# Patient Record
Sex: MALE | Race: BLACK OR AFRICAN AMERICAN | NOT HISPANIC OR LATINO | Employment: OTHER | URBAN - METROPOLITAN AREA
[De-identification: names, ages, dates, MRNs, and addresses within clinical notes are randomized per-mention and may not be internally consistent; named-entity substitution may affect disease eponyms.]

---

## 2020-10-16 ENCOUNTER — HOSPITAL ENCOUNTER (EMERGENCY)
Facility: HOSPITAL | Age: 30
Discharge: HOME | End: 2020-10-16
Attending: EMERGENCY MEDICINE

## 2020-10-16 VITALS
OXYGEN SATURATION: 97 % | BODY MASS INDEX: 35.55 KG/M2 | DIASTOLIC BLOOD PRESSURE: 79 MMHG | RESPIRATION RATE: 18 BRPM | TEMPERATURE: 99 F | SYSTOLIC BLOOD PRESSURE: 131 MMHG | HEIGHT: 69 IN | WEIGHT: 240 LBS | HEART RATE: 67 BPM

## 2020-10-16 DIAGNOSIS — S61.214A LACERATION OF RIGHT RING FINGER WITHOUT FOREIGN BODY WITHOUT DAMAGE TO NAIL, INITIAL ENCOUNTER: ICD-10-CM

## 2020-10-16 DIAGNOSIS — S61.316A LACERATION OF RIGHT LITTLE FINGER WITHOUT FOREIGN BODY WITH DAMAGE TO NAIL, INITIAL ENCOUNTER: Primary | ICD-10-CM

## 2020-10-16 PROCEDURE — 12001 RPR S/N/AX/GEN/TRNK 2.5CM/<: CPT

## 2020-10-16 PROCEDURE — 99283 EMERGENCY DEPT VISIT LOW MDM: CPT | Mod: 25

## 2020-10-16 PROCEDURE — 25000003 PHARM REV CODE 250: Performed by: PHYSICIAN ASSISTANT

## 2020-10-16 RX ORDER — CEPHALEXIN 500 MG/1
500 CAPSULE ORAL 4 TIMES DAILY
Qty: 28 CAPSULE | Refills: 0 | Status: SHIPPED | OUTPATIENT
Start: 2020-10-16 | End: 2020-10-23

## 2020-10-16 RX ORDER — CEPHALEXIN 500 MG/1
500 CAPSULE ORAL
Status: COMPLETED | OUTPATIENT
Start: 2020-10-16 | End: 2020-10-16

## 2020-10-16 RX ORDER — IBUPROFEN 600 MG/1
600 TABLET ORAL EVERY 6 HOURS PRN
Qty: 20 TABLET | Refills: 0 | Status: SHIPPED | OUTPATIENT
Start: 2020-10-16

## 2020-10-16 RX ORDER — HYDROCODONE BITARTRATE AND ACETAMINOPHEN 5; 325 MG/1; MG/1
1 TABLET ORAL EVERY 6 HOURS PRN
Qty: 10 TABLET | Refills: 0 | Status: SHIPPED | OUTPATIENT
Start: 2020-10-16

## 2020-10-16 RX ORDER — BACITRACIN ZINC 500 UNIT/G
OINTMENT (GRAM) TOPICAL 2 TIMES DAILY
Qty: 14 G | Refills: 0 | Status: SHIPPED | OUTPATIENT
Start: 2020-10-16

## 2020-10-16 RX ORDER — LIDOCAINE HYDROCHLORIDE 10 MG/ML
10 INJECTION INFILTRATION; PERINEURAL
Status: COMPLETED | OUTPATIENT
Start: 2020-10-16 | End: 2020-10-16

## 2020-10-16 RX ORDER — IBUPROFEN 600 MG/1
600 TABLET ORAL
Status: COMPLETED | OUTPATIENT
Start: 2020-10-16 | End: 2020-10-16

## 2020-10-16 RX ADMIN — CEPHALEXIN 500 MG: 500 CAPSULE ORAL at 07:10

## 2020-10-16 RX ADMIN — LIDOCAINE HYDROCHLORIDE 10 ML: 10 INJECTION, SOLUTION INFILTRATION; PERINEURAL at 07:10

## 2020-10-16 RX ADMIN — IBUPROFEN 600 MG: 600 TABLET, FILM COATED ORAL at 07:10

## 2020-10-16 RX ADMIN — BACITRACIN, NEOMYCIN, POLYMYXIN B 1 EACH: 400; 3.5; 5 OINTMENT TOPICAL at 07:10

## 2020-10-16 NOTE — ED TRIAGE NOTES
Pt presents to the ED via personal transportation reporting about 15 minutes ago PTA he cut the top of his right pinky finger off. Pt also has a laceration noted to the ring finger on the right hand. Pt reports tetanus is up to date. Bleeding controlled at this time. Pt in NAD.

## 2020-10-16 NOTE — ED PROVIDER NOTES
Encounter Date: 10/16/2020       History     Chief Complaint   Patient presents with    Extremity Laceration     right pinky finger laceration PTA bleeding controlled     28-year-old male with chief complaint laceration to right hand 4th and 5th digits sustained around 3:00 p.m. while at work today.  Patient states hand caught between a tree and the bucket of a front loading tractor; denies significant crush injury, however states he did cut his 2 fingers.  Mild numbness to the radial aspect of the tip of the 4th digit, avulsion of the tuft of the 5th digit.  No uncontrolled bleeding.  No foreign body.  Unknown tetanus status.  He is right-handed.  Denies any other injuries.        Review of patient's allergies indicates:  No Known Allergies  History reviewed. No pertinent past medical history.  Past Surgical History:   Procedure Laterality Date    HERNIA REPAIR      SHOULDER ARTHROSCOPY W/ CAPSULAR REPAIR       History reviewed. No pertinent family history.  Social History     Tobacco Use    Smoking status: Never Smoker    Smokeless tobacco: Never Used   Substance Use Topics    Alcohol use: Not Currently    Drug use: Not on file     Review of Systems   Constitutional: Negative for fever.   Respiratory: Negative for shortness of breath.    Cardiovascular: Negative for chest pain.   Gastrointestinal: Negative for nausea and vomiting.   Musculoskeletal: Positive for arthralgias. Negative for neck pain.   Skin: Positive for wound.   Neurological: Positive for numbness. Negative for weakness.       Physical Exam     Initial Vitals [10/16/20 1818]   BP Pulse Resp Temp SpO2   (!) 136/90 72 18 98.3 °F (36.8 °C) 96 %      MAP       --         Physical Exam    Nursing note and vitals reviewed.  Constitutional: He appears well-developed and well-nourished. He is not diaphoretic. No distress.   HENT:   Head: Normocephalic and atraumatic.   Eyes: EOM are normal.   Neck: Neck supple.   Cardiovascular: Intact distal  pulses.   2+ radial bilaterally   Pulmonary/Chest: No respiratory distress.   Musculoskeletal: Normal range of motion.      Comments: Right hand, 4th digit with laceration to the distal phalanx adjacent to the nail, ulnar aspect of finger.  There is mild numbness to the area distal to the laceration.  No uncontrolled bleeding.  No foreign body.  No nail involvement.  No bony deformity.    5th digit with avulsion of the tuft of the finger, the tip of the nail distally has been cut, however the root and proximal aspect of the nail and distal phalanx is unaffected.  Possible exposed bone.  No uncontrolled bleeding.   Neurological: He is alert and oriented to person, place, and time.   Skin: Skin is warm. Capillary refill takes less than 2 seconds.   Psychiatric: He has a normal mood and affect. Thought content normal.         ED Course   Lac Repair    Date/Time: 10/16/2020 2:44 AM  Performed by: Reece Anglin PA-C  Authorized by: Wai Shelley MD     Consent:     Consent obtained:  Verbal    Consent given by:  Patient    Risks discussed:  Infection, pain, need for additional repair, poor cosmetic result, nerve damage and poor wound healing    Alternatives discussed:  No treatment  Anesthesia (see MAR for exact dosages):     Anesthesia method:  Nerve block    Block location:  Proximal phalanx 4th, 5th digits    Block needle gauge:  27 G    Block anesthetic:  Lidocaine 1% w/o epi    Block injection procedure:  Anatomic landmarks identified, introduced needle, incremental injection, anatomic landmarks palpated and negative aspiration for blood    Block outcome:  Anesthesia achieved  Laceration details:     Location:  Finger    Finger location:  R ring finger  Repair type:     Repair type:  Simple  Pre-procedure details:     Preparation:  Patient was prepped and draped in usual sterile fashion and imaging obtained to evaluate for foreign bodies  Exploration:     Hemostasis achieved with:  Direct pressure    Wound  exploration: wound explored through full range of motion and entire depth of wound probed and visualized      Wound extent: nerve damage      Contaminated: no    Treatment:     Area cleansed with:  Betadine and saline    Amount of cleaning:  Standard    Irrigation solution:  Sterile saline    Irrigation method:  Syringe  Skin repair:     Repair method:  Sutures    Suture size:  5-0    Suture material:  Nylon    Suture technique:  Simple interrupted    Number of sutures:  3  Approximation:     Approximation:  Loose  Post-procedure details:     Dressing:  Antibiotic ointment, adhesive bandage and splint for protection    Patient tolerance of procedure:  Tolerated well, no immediate complications      Labs Reviewed - No data to display       Imaging Results          X-Ray Hand 3 view Right (Final result)  Result time 10/16/20 19:48:16    Final result by Елена Cartagena MD (10/16/20 19:48:16)                 Impression:      Soft tissue amputation at the tip of the 5th digit.  Two tiny hyperdensities adjacent to the tuft of the 5th digit, as above described.      Electronically signed by: Елена Cartagena  Date:    10/16/2020  Time:    19:48             Narrative:    EXAMINATION:  THREE VIEWS OF THE RIGHT HAND    CLINICAL HISTORY:  laceration; suspect tuft fx 5th digit, laceration also 4th distal phalanx ulnar aspect;    TECHNIQUE:  AP, lateral, and oblique views of the right hand    COMPARISON:  None.    FINDINGS:  Three views of the right hand demonstrate soft tissue amputation of the tip of the 5th digit.  There are 2 tiny ossific densities adjacent to the distal tuft.  These may represent tiny bony fragments.  No displaced fractures are detected.                              X-Rays:   Independently Interpreted Readings:   Other Readings:  No significant fracture to the 5th digit distal phalanx tuft, however 2 small densities adjacent to the distal bone.     Medical Decision Making:   Initial Assessment:    30-year-old male with lacerations to his 4th and 5th digits of his right hand sustained today.  He is right-handed.  Differential Diagnosis:   Laceration, open fracture, infected wound  ED Management:  Fifth digit irrigated well, covered with Betadine.  Vaseline gauze nonstick dressing applied.  Pressure dressing applied.  No uncontrolled bleeding.  Will refer to Hand surgery for re-evaluation of wound given possible bony exposure.  Systemic antibiotics, follow-up with ortho.  ED return precautions given.  Aluminum finger splint applied.    Fourth digit repair without issue.                             Clinical Impression:       ICD-10-CM ICD-9-CM   1. Laceration of right little finger without foreign body with damage to nail, initial encounter  S61.316A 883.0   2. Laceration of right ring finger without foreign body without damage to nail, initial encounter  S61.214A 883.0                          ED Disposition Condition    Discharge Stable        ED Prescriptions     Medication Sig Dispense Start Date End Date Auth. Provider    cephALEXin (KEFLEX) 500 MG capsule Take 1 capsule (500 mg total) by mouth 4 (four) times daily. for 7 days 28 capsule 10/16/2020 10/23/2020 Reece Anglin PA-C    ibuprofen (ADVIL,MOTRIN) 600 MG tablet Take 1 tablet (600 mg total) by mouth every 6 (six) hours as needed for Pain. 20 tablet 10/16/2020  Reece Anglin PA-C    bacitracin 500 unit/gram Oint Apply topically 2 (two) times daily. 14 g 10/16/2020  Reece Anglin PA-C    HYDROcodone-acetaminophen (NORCO) 5-325 mg per tablet Take 1 tablet by mouth every 6 (six) hours as needed (Severe/breakthrough pain). 10 tablet 10/16/2020  Reece Anglin PA-C        Follow-up Information     Follow up With Specialties Details Why Contact Info    Sai Babcock III, MD Orthopedic Surgery Schedule an appointment as soon as possible for a visit  For wound re-check, For reevaluation [address] [facility]  [address]  [place]  [id number]  [phone]                                                Reece Anglin PA-C  10/17/20 0245

## 2020-10-17 NOTE — DISCHARGE INSTRUCTIONS
Take all antibiotics as prescribed, try to take with meals to limit nausea.  Ibuprofen for pain.  Norco for severe breakthrough pain. Be aware, this medication is sedating.  Do not mix with alcohol or any other sedating medications.  Do not drive or operate machinery when taking this medication.     Sutures need to be removed in 10-12 days.  Follow-up with primary or follow-up with Orthopedics for re-evaluation of wounds.  Return to this ED if you experience worsening pain, if wound begins to drain foul-smelling fluid, if you begin with fever, if any other problems occur.    Please follow-up with Orthopedics for re-evaluation of open wound.

## 2021-09-18 ENCOUNTER — IMMUNIZATION (OUTPATIENT)
Dept: PRIMARY CARE CLINIC | Facility: CLINIC | Age: 31
End: 2021-09-18

## 2021-09-18 DIAGNOSIS — Z23 NEED FOR VACCINATION: Primary | ICD-10-CM

## 2021-09-18 PROCEDURE — 91300 COVID-19, MRNA, LNP-S, PF, 30 MCG/0.3 ML DOSE VACCINE: CPT | Mod: S$GLB,,, | Performed by: INTERNAL MEDICINE

## 2021-09-18 PROCEDURE — 0001A COVID-19, MRNA, LNP-S, PF, 30 MCG/0.3 ML DOSE VACCINE: ICD-10-PCS | Mod: CV19,S$GLB,, | Performed by: INTERNAL MEDICINE

## 2021-09-18 PROCEDURE — 0001A COVID-19, MRNA, LNP-S, PF, 30 MCG/0.3 ML DOSE VACCINE: CPT | Mod: CV19,S$GLB,, | Performed by: INTERNAL MEDICINE

## 2021-09-18 PROCEDURE — 91300 COVID-19, MRNA, LNP-S, PF, 30 MCG/0.3 ML DOSE VACCINE: ICD-10-PCS | Mod: S$GLB,,, | Performed by: INTERNAL MEDICINE

## 2021-10-09 ENCOUNTER — IMMUNIZATION (OUTPATIENT)
Dept: PRIMARY CARE CLINIC | Facility: CLINIC | Age: 31
End: 2021-10-09

## 2021-10-09 DIAGNOSIS — Z23 NEED FOR VACCINATION: Primary | ICD-10-CM

## 2021-10-09 PROCEDURE — 91300 COVID-19, MRNA, LNP-S, PF, 30 MCG/0.3 ML DOSE VACCINE: CPT | Mod: PBBFAC | Performed by: INTERNAL MEDICINE

## 2021-10-09 PROCEDURE — 0002A COVID-19, MRNA, LNP-S, PF, 30 MCG/0.3 ML DOSE VACCINE: CPT | Mod: CV19,PBBFAC | Performed by: INTERNAL MEDICINE
